# Patient Record
Sex: MALE | Race: OTHER | NOT HISPANIC OR LATINO
[De-identification: names, ages, dates, MRNs, and addresses within clinical notes are randomized per-mention and may not be internally consistent; named-entity substitution may affect disease eponyms.]

---

## 2020-04-22 ENCOUNTER — TRANSCRIPTION ENCOUNTER (OUTPATIENT)
Age: 40
End: 2020-04-22

## 2020-11-25 ENCOUNTER — EMERGENCY (EMERGENCY)
Facility: HOSPITAL | Age: 40
LOS: 1 days | Discharge: HOME | End: 2020-11-25
Attending: EMERGENCY MEDICINE
Payer: COMMERCIAL

## 2020-11-25 VITALS
TEMPERATURE: 98 F | HEIGHT: 74 IN | HEART RATE: 122 BPM | SYSTOLIC BLOOD PRESSURE: 126 MMHG | DIASTOLIC BLOOD PRESSURE: 86 MMHG | OXYGEN SATURATION: 97 % | RESPIRATION RATE: 18 BRPM | WEIGHT: 246.04 LBS

## 2020-11-25 VITALS
SYSTOLIC BLOOD PRESSURE: 131 MMHG | OXYGEN SATURATION: 99 % | HEART RATE: 116 BPM | DIASTOLIC BLOOD PRESSURE: 84 MMHG | RESPIRATION RATE: 18 BRPM

## 2020-11-25 PROCEDURE — 13132 CMPLX RPR F/C/C/M/N/AX/G/H/F: CPT

## 2020-11-25 PROCEDURE — 90715 TDAP VACCINE 7 YRS/> IM: CPT

## 2020-11-25 PROCEDURE — 90675 RABIES VACCINE IM: CPT

## 2020-11-25 PROCEDURE — 96372 THER/PROPH/DIAG INJ SC/IM: CPT | Mod: XU

## 2020-11-25 PROCEDURE — 90377 RABIES IG HT&SOL HUMAN IM/SC: CPT

## 2020-11-25 PROCEDURE — 90471 IMMUNIZATION ADMIN: CPT

## 2020-11-25 PROCEDURE — 90472 IMMUNIZATION ADMIN EACH ADD: CPT

## 2020-11-25 PROCEDURE — 99285 EMERGENCY DEPT VISIT HI MDM: CPT | Mod: 25

## 2020-11-25 PROCEDURE — 99284 EMERGENCY DEPT VISIT MOD MDM: CPT

## 2020-11-25 RX ORDER — IBUPROFEN 200 MG
600 TABLET ORAL ONCE
Refills: 0 | Status: COMPLETED | OUTPATIENT
Start: 2020-11-25 | End: 2020-11-25

## 2020-11-25 RX ORDER — HUMAN RABIES VIRUS IMMUNE GLOBULIN 150 [IU]/ML
2200 INJECTION, SOLUTION INTRAMUSCULAR ONCE
Refills: 0 | Status: COMPLETED | OUTPATIENT
Start: 2020-11-25 | End: 2020-11-25

## 2020-11-25 RX ORDER — ACETAMINOPHEN 500 MG
650 TABLET ORAL ONCE
Refills: 0 | Status: COMPLETED | OUTPATIENT
Start: 2020-11-25 | End: 2020-11-25

## 2020-11-25 RX ORDER — RABIES VACC, HUMAN DIPLOID/PF 2.5 UNIT
1 VIAL (EA) INTRAMUSCULAR ONCE
Refills: 0 | Status: COMPLETED | OUTPATIENT
Start: 2020-11-25 | End: 2020-11-25

## 2020-11-25 RX ORDER — TETANUS TOXOID, REDUCED DIPHTHERIA TOXOID AND ACELLULAR PERTUSSIS VACCINE, ADSORBED 5; 2.5; 8; 8; 2.5 [IU]/.5ML; [IU]/.5ML; UG/.5ML; UG/.5ML; UG/.5ML
0.5 SUSPENSION INTRAMUSCULAR ONCE
Refills: 0 | Status: COMPLETED | OUTPATIENT
Start: 2020-11-25 | End: 2020-11-25

## 2020-11-25 RX ADMIN — Medication 650 MILLIGRAM(S): at 19:08

## 2020-11-25 RX ADMIN — Medication 1 MILLILITER(S): at 20:59

## 2020-11-25 RX ADMIN — Medication 600 MILLIGRAM(S): at 19:08

## 2020-11-25 RX ADMIN — HUMAN RABIES VIRUS IMMUNE GLOBULIN 2200 UNIT(S): 150 INJECTION, SOLUTION INTRAMUSCULAR at 21:48

## 2020-11-25 RX ADMIN — TETANUS TOXOID, REDUCED DIPHTHERIA TOXOID AND ACELLULAR PERTUSSIS VACCINE, ADSORBED 0.5 MILLILITER(S): 5; 2.5; 8; 8; 2.5 SUSPENSION INTRAMUSCULAR at 19:40

## 2020-11-25 RX ADMIN — Medication 1 TABLET(S): at 20:20

## 2020-11-25 NOTE — ED PROVIDER NOTE - PROGRESS NOTE DETAILS
Plastics at bedside for laceration repair.  -Sher Corrigan PA-C Animal bite and rabies PEP form filled out online. [facility] contacted to report bite/rapies vaccine.   -Sher Corrigan PA-C

## 2020-11-25 NOTE — ED PROVIDER NOTE - PHYSICAL EXAMINATION
MERCY Corrigan PA-C see below:  GEN: NAD, awake, eyes open spontaneously  HEENT: NCAT, MMM, Trachea midline, normal conjunctiva, perrl  CHEST/LUNGS: Non-tachypneic, CTAB, bilateral breath sounds  CARDIAC: s1/,s2, RRR.  ABDOMEN: Soft, NTND, No rebound/guarding  MSK: No edema, no gross deformity of extremities  SKIN: +open laceration to right side of upper lip, bite madiha laceration on inside of right bicep  NEURO: CN grossly intact, normal coordination, no focal motor or sensory deficits  PSYCH: Alert, appropriate, cooperative, with capacity and insight   -------------------------- MERCY Corrigan PA-C see below:  GEN: NAD, awake, eyes open spontaneously  HEENT: NCAT, MMM, Trachea midline, normal conjunctiva, perrl  CHEST/LUNGS: Non-tachypneic, CTAB, bilateral breath sounds  CARDIAC: s1/,s2, RRR.  ABDOMEN: Soft, NTND, No rebound/guarding  MSK: No edema, no gross deformity of extremities  SKIN: +open laceration to right side of upper lip, bite madiha laceration on inside of right bicep - sensation intact  NEURO: CN grossly intact, normal coordination, no focal motor or sensory deficits  PSYCH: Alert, appropriate, cooperative, with capacity and insight   -------------------------- MERCY Corrigan PA-C see below:  GEN: NAD, awake, eyes open spontaneously  HEENT: NCAT, MMM, Trachea midline, normal conjunctiva, perrl  CHEST/LUNGS: Non-tachypneic, CTAB, bilateral breath sounds  CARDIAC: s1/,s2, RRR.  ABDOMEN: Soft, NTND, No rebound/guarding  MSK: No edema, no gross deformity of extremities  SKIN: +open laceration to right side of upper lip, bite madiha laceration on medial aspect of right bicep - sensation intact.  No tenderness to palpation at R bicep  NEURO: CN grossly intact, normal coordination, no focal motor or sensory deficits  PSYCH: Alert, appropriate, cooperative, with capacity and insight   --------------------------

## 2020-11-25 NOTE — ED PROVIDER NOTE - NSFOLLOWUPINSTRUCTIONS_ED_ALL_ED_FT
You were seen in the Emergency Department for a dog bite. Your laceration was repaired, you received antibiotics, a tetanus vaccination, and a rabies vaccine/rabies immunoglobulin.  You must return on days 3, 7, and 14 from bite (November 28th, December 2nd, and December 9th) for three more doses of rabies immunoglobulin.   Please take Augmentin 2x a day for 7 days for antibiotic treatment.   Follow-up with your primary care provider in 1-2 days.  Continue to take all medications as prescribed.  Rest and drink plenty of fluids. Pain can be managed with Acetaminophen (aka Tylenol) and Ibuprofen (aka Motrin or Advil) over the counter as directed.  Return to the ER for any new or worsening symptoms such as decreased sensation/motor function, fever/chills, drainage/pus from sight, increased redness, worsening pain.

## 2020-11-25 NOTE — ED PROVIDER NOTE - RAPID ASSESSMENT
40 year old M  with pmhx of HTN and DM presents to ED c/o dog bite to lip. Pt was changing shifts when a stray dog bit him on upper and lower lip. No pain meds PTA. Unsure if tetanus is UTD. NKDA.     Chris cMintosh (MD) note: The scribe's (Lali Tran) documentation has been prepared under my direction and personally reviewed by me.  Patient was seen as a tele QDOC patient. The patient will be seen and further worked up in the main emergency department and their care will be completed by the main emergency department team along with a thorough physical exam. Receiving team will follow up on labs, analgesia, any clinical imaging, reassess and disposition as clinically indicated, all decisions regarding the progression of care will be made at their discretion.     Scribe Statement: IChelsea, attest that this documentation has been prepared under the direction and in the presence of Doctor Chris Mcintosh (MD) 40 year old M  with pmhx of HTN and DM presents to ED c/o dog bite to lip. Pt was off duty and changing shifts when a stray dog bit him on upper and lower lip. No pain meds PTA. Unsure if tetanus is UTD. NKDA.     Chris Mcintosh (MD) note: The scribe's (Lali Tran) documentation has been prepared under my direction and personally reviewed by me.  Patient was seen as a tele QDOC patient. The patient will be seen and further worked up in the main emergency department and their care will be completed by the main emergency department team along with a thorough physical exam. Receiving team will follow up on labs, analgesia, any clinical imaging, reassess and disposition as clinically indicated, all decisions regarding the progression of care will be made at their discretion.     Scribe Statement: Chelsea MILLS, attest that this documentation has been prepared under the direction and in the presence of Doctor Chris Mcintosh (MD)

## 2020-11-25 NOTE — ED PROVIDER NOTE - PATIENT PORTAL LINK FT
You can access the FollowMyHealth Patient Portal offered by [facility] by registering at the following website: [email or web address]. By joining [employer]’s FollowMyHealth portal, you will also be able to view your health information using other applications (apps) compatible with our system.

## 2020-11-25 NOTE — ED ADULT NURSE NOTE - OBJECTIVE STATEMENT
41 Yo male PMHx HTN, DM c/o dog bite to R-lip and R-arm. Patient reports dog is a stray, unknown immunizations. Dog appears well, no obvious health issues noted. Patient is A&OX3, laceration to R-upper lip and superficial bite madiha to R-forearm. patient c/o pain to bite areas, given pain medication in triage with some relief. denies chest pain, SOB, HA, N/V/D, abdominal pain, fever/chills, urinary symptoms, hematuria, weakness, dizziness, numbness, tinging. Peripheral pulses present b/l. Skin warm, dry and pink. Pt placed in position of comfort. Pt educated on call bell system and provided call bell. Bed in lowest position, wheels locked, appropriate side rails raised. Pt denies needs at this time.

## 2020-11-25 NOTE — ED PROVIDER NOTE - CLINICAL SUMMARY MEDICAL DECISION MAKING FREE TEXT BOX
Upper lip and R medial bicep laceration 2/2 dog bite.  No e/o fb, infection, RUE is NVI, compartments soft.  Unknown dog, likely domesticated as reported to have leash/collar but no owner and it is unclear if dog was captured by animal control.  Already contacted plastic surgeon who will see pt in ED.  Will need tdap, abx, rabies Ig and Ab.  Reassess.  --BMM

## 2020-11-28 ENCOUNTER — EMERGENCY (EMERGENCY)
Facility: HOSPITAL | Age: 40
LOS: 1 days | Discharge: HOME | End: 2020-11-28
Attending: EMERGENCY MEDICINE
Payer: COMMERCIAL

## 2020-11-28 VITALS
HEIGHT: 74 IN | TEMPERATURE: 98 F | DIASTOLIC BLOOD PRESSURE: 82 MMHG | RESPIRATION RATE: 20 BRPM | OXYGEN SATURATION: 99 % | WEIGHT: 246.04 LBS | SYSTOLIC BLOOD PRESSURE: 133 MMHG | HEART RATE: 113 BPM

## 2020-11-28 VITALS
SYSTOLIC BLOOD PRESSURE: 118 MMHG | HEART RATE: 107 BPM | OXYGEN SATURATION: 96 % | TEMPERATURE: 98 F | RESPIRATION RATE: 20 BRPM | DIASTOLIC BLOOD PRESSURE: 86 MMHG

## 2020-11-28 PROCEDURE — 90675 RABIES VACCINE IM: CPT

## 2020-11-28 PROCEDURE — 99283 EMERGENCY DEPT VISIT LOW MDM: CPT | Mod: 25

## 2020-11-28 PROCEDURE — 90471 IMMUNIZATION ADMIN: CPT

## 2020-11-28 PROCEDURE — 99283 EMERGENCY DEPT VISIT LOW MDM: CPT

## 2020-11-28 RX ORDER — RABIES VACC, HUMAN DIPLOID/PF 2.5 UNIT
1 VIAL (EA) INTRAMUSCULAR ONCE
Refills: 0 | Status: COMPLETED | OUTPATIENT
Start: 2020-11-28 | End: 2020-11-28

## 2020-11-28 RX ORDER — RABIES VACC, HUMAN DIPLOID/PF 2.5 UNIT
1 VIAL (EA) INTRAMUSCULAR ONCE
Refills: 0 | Status: DISCONTINUED | OUTPATIENT
Start: 2020-11-28 | End: 2020-12-02

## 2020-11-28 RX ADMIN — Medication 1 MILLILITER(S): at 19:42

## 2020-11-28 NOTE — ED PROVIDER NOTE - CLINICAL SUMMARY MEDICAL DECISION MAKING FREE TEXT BOX
40y m pmhx HTN, DM presenting for 2nd rabies vaccine. pt initially seen here 2 days ago after dog bite to lip and arm. had primary repair done by plastics, was given tetanus, rabies IG and first rabies vacc. denies symptoms since first vac, has followup with plastics. well appearing, non focal exam. will give 2nd rabies vacc, ban - Levi Barone PA-C 40y m pmhx HTN, DM presenting for 2nd rabies vaccine. pt initially seen here 2 days ago after dog bite to lip and arm. had primary repair done by plastics, was given tetanus, rabies IG and first rabies vacc. denies symptoms since first vac, has followup with plastics. well appearing, non focal exam. will give 2nd rabies vacc, dc - LONI Bledsoe MD - Attending Physician: Pt here with repeat rabies vaccination. No complaints. Today is day 3. Wound healing. Notes arm pain at site of Tdap, but no signs of cellulitis, FROM arm. Return on 12/2 for 3rd dose of vaccine

## 2020-11-28 NOTE — ED PROVIDER NOTE - NSFOLLOWUPINSTRUCTIONS_ED_ALL_ED_FT
You were seen in the Emergency Department for a dog bite. Your laceration was repaired, you received antibiotics, a tetanus vaccination, and a rabies vaccine/rabies immunoglobulin.    You must return on days 3, 7, and 14 from bite (November 28th, December 2nd, and December 9th) for three more doses of rabies immunoglobulin.     Please take Augmentin 2x a day for 7 days for antibiotic treatment.     Follow-up with your primary care provider in 1-2 days.    Continue to take all medications as prescribed.    Rest and drink plenty of fluids.     Pain can be managed with Acetaminophen (aka Tylenol) and Ibuprofen (aka Motrin or Advil) over the counter as directed.  Return to the ER for any new or worsening symptoms such as decreased sensation/motor function, fever/chills, drainage/pus from sight, increased redness, worsening pain You were seen in the Emergency Department for a dog bite. Your laceration was repaired, you received antibiotics, a tetanus vaccination, and a rabies vaccine/rabies immunoglobulin.    You must return on days 7, and 14 from bite (December 2nd, and December 9th) for two more doses of rabies immunoglobulin.     Please take Augmentin 2x a day for 7 days for antibiotic treatment.     Follow-up with your primary care provider in 1-2 days.    Continue to take all medications as prescribed.    Rest and drink plenty of fluids.     Pain can be managed with Acetaminophen (aka Tylenol) and Ibuprofen (aka Motrin or Advil) over the counter as directed.  Return to the ER for any new or worsening symptoms such as decreased sensation/motor function, fever/chills, drainage/pus from sight, increased redness, worsening pain

## 2020-11-28 NOTE — ED PROVIDER NOTE - SKIN, MLM
Skin normal color for race, warm, dry and intact. No evidence of rash. Laceration repair sites intact without warmth, redness, discharge, or tenderness.

## 2020-11-28 NOTE — ED ADULT NURSE REASSESSMENT NOTE - NS ED NURSE REASSESS COMMENT FT1
Handoff report received from KUNAL Farley. Pt resting comfortably in stretcher in orange room 8. Pt denies any needs at this time. VSS. Pt denies pain. Gave pt rabies vaccine as per order. Pt tolerated well. Pending discharge paperwork.

## 2020-11-28 NOTE — ED ADULT NURSE NOTE - OBJECTIVE STATEMENT
39 yo male complaining about dog bite and needs a rabies vaccine (2nd) pt was in the ER 4 days ago. Just needs the second dosage of the vaccine. Pt alerted and oriented x3, no sings of acute distress noted at this moment, vitals WNL. no sings of infection, will continue to monitor closely, pt pending on MD examination

## 2020-11-28 NOTE — ED PROVIDER NOTE - PATIENT PORTAL LINK FT
You can access the FollowMyHealth Patient Portal offered by [facility] by registering at the following website: [email or web address]. By joining [employer]’s FollowMyHealth portal, you will also be able to view your health information using other applications (apps) compatible with our system.

## 2020-11-28 NOTE — ED PROVIDER NOTE - OBJECTIVE STATEMENT
40y m PMHx DM, HTN presenting for 2nd rabies vaccine. Pt was seen here 2 days ago after dog bite. pt works for [email or web address], sustained dog bite. in ED had primary repair and was given rabies IG and 1st dose of vaccine. reports minimal discomfort at the arm from the tetanus vaccine but denies fever, chills, redness/warth at injection sites, discharge, HA, confusion, or any other complaints. 40y m PMHx DM, HTN presenting for 2nd rabies vaccine. Pt was seen here 2 days ago after dog bite. pt works for [employer], sustained dog bite. in ED had primary repair and was given rabies IG and 1st dose of vaccine. reports minimal discomfort at the arm from the tetanus vaccine but denies fever, chills, redness/warmth at injection sites, discharge, HA, confusion, or any other complaints.

## 2020-12-03 ENCOUNTER — EMERGENCY (EMERGENCY)
Facility: HOSPITAL | Age: 40
LOS: 1 days | Discharge: HOME | End: 2020-12-03
Attending: EMERGENCY MEDICINE
Payer: COMMERCIAL

## 2020-12-03 VITALS
OXYGEN SATURATION: 98 % | HEART RATE: 103 BPM | SYSTOLIC BLOOD PRESSURE: 129 MMHG | RESPIRATION RATE: 16 BRPM | DIASTOLIC BLOOD PRESSURE: 81 MMHG | TEMPERATURE: 98 F | HEIGHT: 74 IN | WEIGHT: 255.96 LBS

## 2020-12-03 PROCEDURE — 90675 RABIES VACCINE IM: CPT

## 2020-12-03 PROCEDURE — 90471 IMMUNIZATION ADMIN: CPT

## 2020-12-03 PROCEDURE — 99283 EMERGENCY DEPT VISIT LOW MDM: CPT | Mod: 25

## 2020-12-03 PROCEDURE — 99283 EMERGENCY DEPT VISIT LOW MDM: CPT

## 2020-12-03 RX ORDER — RABIES VACC, HUMAN DIPLOID/PF 2.5 UNIT
1 VIAL (EA) INTRAMUSCULAR ONCE
Refills: 0 | Status: COMPLETED | OUTPATIENT
Start: 2020-12-03 | End: 2020-12-03

## 2020-12-03 RX ADMIN — Medication 1 MILLILITER(S): at 20:25

## 2020-12-03 NOTE — ED PROVIDER NOTE - NSFOLLOWUPINSTRUCTIONS_ED_ALL_ED_FT
[id number]  *More detailed information regarding your visit and discharge can be found by reviewing this packet.*  -----------------------------    Your diagnosis this visit was: encounter for rabies vaccine     From this ED visit you were prescribed: No new Rx. Please return in one week on Dec 10th for final rabies vaccine.     Please return to the Emergency Department if you experience any of the following symptoms:  - Shortness of breath or trouble breathing  - Pressure, pain, or tightness in the chest  - Face drooping, arm weakness or speech difficulty,  - Persistent or severe vomiting  - Head injury or loss of consciousness  - Nonstop bleeding or an open wound    Diet changes/restrictions   [ ] No Diet Restriction

## 2020-12-03 NOTE — ED PROVIDER NOTE - OBJECTIVE STATEMENT
40y m PMHx DM, HTN presenting for 3rd rabies vaccine after dog bite at work ([employer]). 40y m PMHx DM, HTN presenting for 3rd rabies vaccine after dog bite at work ([employer]). Pt w healing lacerations/abrasions to R upper inner forearm and R upper lip. No surrounding erythema, drainage, signs of infection. No neck stiffness, no sob, no cp, no ams, no fever.

## 2020-12-03 NOTE — ED PROVIDER NOTE - CLINICAL SUMMARY MEDICAL DECISION MAKING FREE TEXT BOX
40y m PMHx DM, HTN presenting for 3rd rabies vaccine. Wounds to R upper lip and R inner upper arm healing well. No signs of infection. No additional complaints. To return for final rabies vaccine on Dec 10th.

## 2020-12-03 NOTE — ED PROVIDER NOTE - PATIENT PORTAL LINK FT
You can access the FollowMyHealth Patient Portal offered by [facility] by registering at the following website: [email or web address]. By joining [employer]’s FollowMyHealth portal, you will also be able to view your health information using other applications (apps) compatible with our system.

## 2020-12-03 NOTE — ED ADULT NURSE NOTE - OBJECTIVE STATEMENT
patient is a 40 year old male who presents to the ED from home requesting 3rd rabies shot. patient was attacked by a stray dog. patient sustained a bite on the mouth. site is clean, dry and intact. no signs of infection noted to site. MD Gonsalez at bedside to assess. patient comfort and safety provided.

## 2020-12-03 NOTE — ED PROVIDER NOTE - PHYSICAL EXAMINATION
Gen: WNWD NAD  HEENT: NCAT  EOMI normal pharynx healing lac to R upper lip w some scabbing, no swelling, no erythema   Neck: supple  Ext: wwp, FROMx4, no cce  Skin: wound to R upper lip as described, healing R upper inner arm abrasion/superficial lac  Neuro: A&Ox3, CN grossly intact

## 2020-12-11 ENCOUNTER — EMERGENCY (EMERGENCY)
Facility: HOSPITAL | Age: 40
LOS: 1 days | Discharge: HOME | End: 2020-12-11
Attending: EMERGENCY MEDICINE
Payer: COMMERCIAL

## 2020-12-11 VITALS
SYSTOLIC BLOOD PRESSURE: 117 MMHG | RESPIRATION RATE: 18 BRPM | OXYGEN SATURATION: 100 % | DIASTOLIC BLOOD PRESSURE: 73 MMHG | TEMPERATURE: 98 F | HEART RATE: 90 BPM

## 2020-12-11 VITALS
TEMPERATURE: 98 F | WEIGHT: 246.04 LBS | SYSTOLIC BLOOD PRESSURE: 146 MMHG | RESPIRATION RATE: 20 BRPM | DIASTOLIC BLOOD PRESSURE: 85 MMHG | HEART RATE: 105 BPM | OXYGEN SATURATION: 98 % | HEIGHT: 74 IN

## 2020-12-11 PROCEDURE — 90675 RABIES VACCINE IM: CPT

## 2020-12-11 PROCEDURE — 99283 EMERGENCY DEPT VISIT LOW MDM: CPT

## 2020-12-11 PROCEDURE — 99283 EMERGENCY DEPT VISIT LOW MDM: CPT | Mod: 25

## 2020-12-11 PROCEDURE — 90471 IMMUNIZATION ADMIN: CPT

## 2020-12-11 RX ORDER — RABIES VACC, HUMAN DIPLOID/PF 2.5 UNIT
1 VIAL (EA) INTRAMUSCULAR ONCE
Refills: 0 | Status: COMPLETED | OUTPATIENT
Start: 2020-12-11 | End: 2020-12-11

## 2020-12-11 RX ADMIN — Medication 1 MILLILITER(S): at 20:41

## 2020-12-11 NOTE — ED PROVIDER NOTE - OBJECTIVE STATEMENT
39 y/o M with pmhx of htn, diabetes presents for last dose of rabies shot. Pt sustained dog bite on lip and R arm by a stray dog weeks ago. Pt denies any complaints.

## 2020-12-11 NOTE — ED PROVIDER NOTE - ATTENDING CONTRIBUTION TO CARE
History and Physical performed by me with scribe under my direct supervision.  MATILDE Nassar MD FACEP

## 2020-12-11 NOTE — ED PROVIDER NOTE - NSFOLLOWUPINSTRUCTIONS_ED_ALL_ED_FT
Your diagnosis this visit was: final rabies vaccine     From this ED visit you were prescribed: No new Rx. No need to return to the emergency department at this time. Follow up with your regular physician within the next 1-2 weeks.     Please return to the Emergency Department if you experience any of the following symptoms:  - Shortness of breath or trouble breathing  - Pressure, pain, or tightness in the chest  - Face drooping, arm weakness or speech difficulty,  - Persistent or severe vomiting  - Head injury or loss of consciousness  - Nonstop bleeding or an open wound

## 2020-12-11 NOTE — ED PROVIDER NOTE - PROGRESS NOTE DETAILS
Oscar Rodriguez, PGY-1: Pt examined at bedside, no signs of infection on oral wound or wound in RUE. Pt presenting for final rabies vaccine in series. No new symptoms since previous presentation. Will provide final dose and discharge for normal PCP follow-up

## 2020-12-11 NOTE — ED PROVIDER NOTE - CLINICAL SUMMARY MEDICAL DECISION MAKING FREE TEXT BOX
[facility] officer p/w request for final rabies vaccine. Accomplish same, d/c home. Pt counseled extensively on smoking cessation.

## 2020-12-11 NOTE — ED ADULT NURSE NOTE - OBJECTIVE STATEMENT
Pt is a 40 y M PMH HTN, DM presents for last dose of rabies shot. Pt sustained dog bite on lip and R arm by a stray dog weeks ago. Pt denies any reaction or issues to previous rabies shots. Pt denies any complaints at this time. A&Ox4, VINCENT, distal pulses intact, abdomen soft nontender, skin intact. Side rails up for safety, call bell and personal items within reach, instructed to call for assistance, verbalizes understanding. Will continue to monitor.

## 2020-12-11 NOTE — ED PROVIDER NOTE - PATIENT PORTAL LINK FT
You can access the FollowMyHealth Patient Portal offered by [facility] by registering at the following website: [email or web address]. By joining [employer]’s FollowMyHealth portal, you will also be able to view your health information using other applications (apps) compatible with our system.

## 2021-02-03 ENCOUNTER — TRANSCRIPTION ENCOUNTER (OUTPATIENT)
Age: 41
End: 2021-02-03

## 2021-06-09 PROBLEM — Z00.00 ENCOUNTER FOR PREVENTIVE HEALTH EXAMINATION: Noted: 2021-06-09

## 2021-06-11 ENCOUNTER — APPOINTMENT (OUTPATIENT)
Dept: ORTHOPEDIC SURGERY | Facility: CLINIC | Age: 41
End: 2021-06-11
Payer: COMMERCIAL

## 2021-06-11 VITALS — WEIGHT: 216 LBS | BODY MASS INDEX: 28.32 KG/M2 | HEIGHT: 73.23 IN

## 2021-06-11 DIAGNOSIS — M25.561 PAIN IN RIGHT KNEE: ICD-10-CM

## 2021-06-11 DIAGNOSIS — M21.162 VARUS DEFORMITY, NOT ELSEWHERE CLASSIFIED, RIGHT KNEE: ICD-10-CM

## 2021-06-11 DIAGNOSIS — M21.161 VARUS DEFORMITY, NOT ELSEWHERE CLASSIFIED, RIGHT KNEE: ICD-10-CM

## 2021-06-11 DIAGNOSIS — M25.562 PAIN IN RIGHT KNEE: ICD-10-CM

## 2021-06-11 DIAGNOSIS — G89.29 PAIN IN RIGHT KNEE: ICD-10-CM

## 2021-06-11 PROCEDURE — 73562 X-RAY EXAM OF KNEE 3: CPT | Mod: LT

## 2021-06-11 PROCEDURE — 99205 OFFICE O/P NEW HI 60 MIN: CPT

## 2021-06-11 RX ORDER — CELECOXIB 100 MG/1
100 CAPSULE ORAL TWICE DAILY
Qty: 10 | Refills: 0 | Status: ACTIVE | COMMUNITY
Start: 2021-06-11 | End: 1900-01-01

## 2022-03-10 NOTE — ED PROVIDER NOTE - OBJECTIVE STATEMENT
40y M PMH HTN, DM presenting s/p dog bite. Pt was bit by a stray dog while at work. Dog bit him on his lip and right arm. Unaware of dogs vaccines. Has not received Tetanus >10y. Pt endorses mild numbness at site but states sensation is intact. Pt denies any fever/chills, chest pain/SOB, abdominal pain, N/V/D. No

## 2022-10-05 NOTE — ED ADULT NURSE NOTE - NS ED NOTE  TALK SOMEONE YN
Breath sounds clear and equal bilaterally. No wheezing, rales or rhonchi. No chest wall tenderness. No

## 2023-02-02 ENCOUNTER — EMERGENCY (EMERGENCY)
Facility: HOSPITAL | Age: 43
LOS: 1 days | Discharge: HOME | End: 2023-02-02
Attending: EMERGENCY MEDICINE
Payer: COMMERCIAL

## 2023-02-02 VITALS
SYSTOLIC BLOOD PRESSURE: 127 MMHG | OXYGEN SATURATION: 98 % | HEART RATE: 90 BPM | DIASTOLIC BLOOD PRESSURE: 87 MMHG | TEMPERATURE: 98 F | WEIGHT: 229.94 LBS | RESPIRATION RATE: 17 BRPM | HEIGHT: 74 IN

## 2023-02-02 VITALS
SYSTOLIC BLOOD PRESSURE: 122 MMHG | HEART RATE: 88 BPM | OXYGEN SATURATION: 99 % | RESPIRATION RATE: 17 BRPM | DIASTOLIC BLOOD PRESSURE: 74 MMHG | TEMPERATURE: 98 F

## 2023-02-02 PROBLEM — E11.9 TYPE 2 DIABETES MELLITUS WITHOUT COMPLICATIONS: Chronic | Status: ACTIVE | Noted: 2020-11-25

## 2023-02-02 PROBLEM — I10 ESSENTIAL (PRIMARY) HYPERTENSION: Chronic | Status: ACTIVE | Noted: 2020-11-25

## 2023-02-02 PROCEDURE — 99284 EMERGENCY DEPT VISIT MOD MDM: CPT

## 2023-02-02 PROCEDURE — 73562 X-RAY EXAM OF KNEE 3: CPT | Mod: 26,50

## 2023-02-02 PROCEDURE — 73560 X-RAY EXAM OF KNEE 1 OR 2: CPT | Mod: 26,LT

## 2023-02-02 PROCEDURE — 73560 X-RAY EXAM OF KNEE 1 OR 2: CPT

## 2023-02-02 PROCEDURE — 73562 X-RAY EXAM OF KNEE 3: CPT

## 2023-02-02 RX ORDER — IBUPROFEN 200 MG
600 TABLET ORAL ONCE
Refills: 0 | Status: COMPLETED | OUTPATIENT
Start: 2023-02-02 | End: 2023-02-02

## 2023-02-02 RX ADMIN — Medication 600 MILLIGRAM(S): at 13:29
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
## 2023-02-02 NOTE — ED PROVIDER NOTE - PATIENT PORTAL LINK FT
You can access the FollowMyHealth Patient Portal offered by [facility] by registering at the following website: [email or web address]. By joining [employer]’s FollowMyHealth portal, you will also be able to view your health information using other applications (apps) compatible with our system.
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
## 2023-02-02 NOTE — ED PROVIDER NOTE - PHYSICAL EXAMINATION
Right medial knee tenderness to palpation no effusion no swelling no ecchymosis no joint instability.  Left knee with mild anterior tenderness to palpation no bruising no swelling or ecchymosis.  Patient able to ambulate with mild limp.  Left lumbar paraspinal back tenderness to palpation.  No midline spinal tenderness palpation.  Strength sensation intact in both arms and legs

## 2023-02-02 NOTE — ED PROVIDER NOTE - NSFOLLOWUPINSTRUCTIONS_ED_ALL_ED_FT
Knee Sprain, Adult       A knee sprain is a stretch or tear in a knee ligament. Knee ligaments are tissues that connect bones in the knee to each other.      What are the causes?    This condition often results from:  •A fall.      •An injury to the knee.        What are the signs or symptoms?    Symptoms of this condition include:  •Trouble straightening or bending the leg.      •Swelling in the knee.      •Bruising around the knee.      •Tenderness or pain in the knee.      •Muscle spasms around the knee.        How is this diagnosed?    This condition may be diagnosed based on:  •A physical exam.      •A history of what happened just before you started to have symptoms.    •Tests, including:  •An X-ray. This may be done to make sure no bones are broken.      •An MRI. This may be done to check if the ligament is torn.      •Stress testing of the knee. This may be done to check ligament damage.          How is this treated?    Treatment for this condition may involve:  •Keeping the knee still (immobilized) with a cast, brace, or splint.      •Applying ice to the knee. This helps with pain and swelling.      •Raising (elevating) the knee above the level of your heart when you are resting. This helps with pain and swelling.      •Taking medicine for pain.      •Doing exercises to prevent or limit permanent weakness or stiffness in your knee.      •Having surgery to reconnect the ligament to the bone or to reconstruct it. This may be needed if the ligament is completely torn.        Follow these instructions at home:    If you have a splint or brace:     •Wear it as told by your health care provider. Remove it only as told by your health care provider.      •Check the skin around it every day. Tell your health care provider about any concerns.      •Loosen it if your toes tingle, become numb, or turn cold and blue.      •Keep it clean and dry.      If you have a cast:     • Do not stick anything inside it to scratch your skin. Doing that increases your risk of infection.      •Check the skin around it every day. Tell your health care provider about any concerns.      •You may put lotion on dry skin around the edges of the cast. Do not put lotion on the skin underneath the cast.      •Keep it clean and dry.      Bathing     If you have a splint, brace, or cast that is not waterproof:  •Do not let it get wet.      •Cover it with a watertight covering when you take a bath or a shower.        Managing pain, stiffness, and swelling  •If directed, put ice on the injured area. To do this:  •If you have a removable splint or brace, remove it as told by your health care provider.      •Put ice in a plastic bag.      •Place a towel between your skin and the bag or between your cast and the bag.      •Leave the ice on for 20 minutes, 2–3 times a day.        •Move your toes often to reduce stiffness and swelling.      •Elevate the injured area above the level of your heart while you are sitting or lying down.      General instructions    •Take over-the-counter and prescription medicines only as told by your health care provider.      •Do not use any products that contain nicotine or tobacco, such as cigarettes, e-cigarettes, and chewing tobacco. These can delay healing. If you need help quitting, ask your health care provider.      •Do exercises as told by your health care provider.      •Keep all follow-up visits as told by your health care provider. This is important.        Contact a health care provider if:    •You have pain that gets worse.      •The cast, brace, or splint does not fit right.      •The cast, brace, or splint gets damaged.        Get help right away if:    •You cannot use your injured knee to support any of your body weight (cannot bear weight).      •You cannot move the injured joint.      •You cannot walk more than a few steps without pain or without your knee buckling.      •You have significant pain, swelling, or numbness in the leg below the cast, brace, or splint.      •Your foot or toes are numb, cold, or blue after loosening your splint or brace.        Summary    •A knee sprain is a stretch or tear in a knee ligament that usually occurs as the result of a fall or injury.      •Treatment may involve immobilizing the knee with a cast, splint, or brace and then doing exercises.      •If the ligament is completely torn, it may require surgery to repair or replace the injured ligament.      This information is not intended to replace advice given to you by your health care provider. Make sure you discuss any questions you have with your health care provider.
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
## 2023-02-10 ENCOUNTER — APPOINTMENT (OUTPATIENT)
Dept: ORTHOPEDIC SURGERY | Facility: CLINIC | Age: 43
End: 2023-02-10

## 2023-05-23 ENCOUNTER — NON-APPOINTMENT (OUTPATIENT)
Age: 43
End: 2023-05-23

## 2023-05-23 ENCOUNTER — APPOINTMENT (OUTPATIENT)
Dept: ORTHOPEDIC SURGERY | Facility: CLINIC | Age: 43
End: 2023-05-23
Payer: OTHER MISCELLANEOUS

## 2023-05-23 DIAGNOSIS — M17.12 UNILATERAL PRIMARY OSTEOARTHRITIS, LEFT KNEE: ICD-10-CM

## 2023-05-23 PROCEDURE — 99204 OFFICE O/P NEW MOD 45 MIN: CPT

## 2023-05-23 RX ORDER — DICLOFENAC SODIUM 50 MG/1
50 TABLET, DELAYED RELEASE ORAL
Qty: 60 | Refills: 0 | Status: ACTIVE | COMMUNITY
Start: 2023-05-23 | End: 1900-01-01

## 2023-05-23 NOTE — DISCUSSION/SUMMARY
[de-identified] : Discussed findings of today's exam and possible causes of patient's pain.  Educated patient on their most probable diagnosis of chronic 15+ years of intermittent bilateral knee pain with recent atraumatic exacerbation due to underlying severe osteoarthritis of the right knee and moderate osteoarthritis of the left knee.  Reviewed possible courses of treatment, and we collaboratively decided best course of treatment at this time will include conservative management.  Patient states that he is having knee pain from a fall that was sustained in 2008 as a work-related injury.  Patient has in system x-rays from 2014 which showed he already had severe osteoarthritis in the right knee and moderate osteoarthritis in the left knee.  Patient recently had a minor mechanical fall in February of this year which caused exacerbation of his pain, he had updated x-rays at that time which showed no significant interval change from 2014, it just redemonstrated severe osteoarthritis in the medial compartment of the right knee and moderate osteoarthritis of the left knee.  Patient appears to have primary osteoarthritis, this does not appear to be posttraumatic osteoarthritis.  Unsure how this is related to a Worker's Compensation injury as there does not seem to be a direct correlation from his injury to later development of osteoarthritis.  Patient states he is already had multiple cortisone injections over the years, he states they have not provided him relief.  At this time he is just having exacerbation of severe OA particularly of the right knee, recommend he follow-up with his joint replacement surgeon to discuss possible knee replacement surgery which was the advice at last evaluation in 2021.  Patient started on a course of oral NSAIDs for short-term pain relief, prescription given for diclofenac (We discussed all possible side effects of this medication).  Patient states he missed work for today's appointment, but otherwise has been working regularly.  Patient appreciates and agrees with current plan.\par \par \par This note was generated using dragon medical dictation software.  A reasonable effort has been made for proofreading its contents, but typos may still remain.  If there are any questions or points of clarification needed please notify my office.
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
## 2023-05-23 NOTE — RETURN TO WORK/SCHOOL
[FreeTextEntry1] : Ulises was seen in the office today for evaluation of bilateral knee osteoarthritis.\par Should you have any questions please call the office at [phone]\par Thank you for your understanding.\par \par Sincerely,\par \par Albino Otto DO, ATC\par Primary Care Sports Medicine\par [facility] [place]\par

## 2023-05-23 NOTE — PHYSICAL EXAM
[de-identified] : Constitutional: Well-nourished, well-developed, No acute distress\par Respiratory:  Good respiratory effort, no SOB\par Lymphatic: No regional lymphadenopathy, no lymphedema\par Psychiatric: Pleasant and normal affect, alert and oriented x3\par Musculoskeletal: normal except where as noted in regional exam\par \par Bilateral knees:\par APPEARANCE: + enlargement of the distal femur and proximal tibia, + varus deformity, 1+ right knee swelling\par POSITIVE TENDERNESS:  Distal femur, proximal tibia, medial jt line/retinaculum, and lateral jt line/retinaculum\par NONTENDER: patellar & quadriceps tendons, MCL/LCL, ITB at the lateral femoral condyle & Gerdy's tubercle, pes bursa. \par ROM: full extension, limited flexion to 110° due to stiffness and pain. \par RESISTIVE TESTING: painless resisted knee flex/ext, although + crepitus felt in anterior knee. \par SPECIAL TESTS: stable v/v stress. painless grind. neg ant/post drawer. + Sourav's for pain in medial and lateral joint line. \par  [de-identified] : I reviewed, interpreted and clinically correlated the following outside imaging studies,\par In system x-rays, 3 views of the bilateral knees from February 2023, evidence of severe medial compartment osteoarthritis with bone-on-bone contact of the right knee, moderate tricompartmental osteoarthritis of the left knee with medial joint line narrowing\par These x-rays were compared to in system x-rays from 2014 which had the same findings without significant interval change.\par \par ____________\par  ACC: [id number] EXAM: XR KNEE AP LAT OBL 3 VIEWS BI ORDERED BY: GAY MOSQUERA\par \par ACC: [id number] EXAM: XR PATELLA 1-2 VIEWS LT ORDERED BY: GAY MOSQUERA\par \par PROCEDURE DATE: 02/02/2023\par \par \par \par INTERPRETATION: Bilateral knees. Patient has local pain after trauma.\par \par Left knee. 4 views are included on the left knee had been and 2 views are included under patellar headache.\par \par There is no definite effusion. There is moderate degeneration mainly around the articular patellar tendon also outer spurring of the medial compartment. No fracture. There is variant anatomy of independent bone fragment superolaterally in the left patella not to be confused with fracture.\par \par Right knee. 2 views obtained.\par \par No effusion. Similar degeneration. No fracture.\par \par IMPRESSION: Bilateral moderate knee degeneration. No acute fracture.

## 2023-06-09 ENCOUNTER — APPOINTMENT (OUTPATIENT)
Dept: ORTHOPEDIC SURGERY | Facility: CLINIC | Age: 43
End: 2023-06-09
Payer: OTHER MISCELLANEOUS

## 2023-06-09 ENCOUNTER — TRANSCRIPTION ENCOUNTER (OUTPATIENT)
Age: 43
End: 2023-06-09

## 2023-06-09 VITALS
SYSTOLIC BLOOD PRESSURE: 121 MMHG | DIASTOLIC BLOOD PRESSURE: 81 MMHG | HEIGHT: 73 IN | BODY MASS INDEX: 29.82 KG/M2 | HEART RATE: 92 BPM | WEIGHT: 225 LBS

## 2023-06-09 DIAGNOSIS — M17.11 UNILATERAL PRIMARY OSTEOARTHRITIS, RIGHT KNEE: ICD-10-CM

## 2023-06-09 PROCEDURE — 99455 WORK RELATED DISABILITY EXAM: CPT

## 2023-06-13 PROBLEM — M17.11 PRIMARY LOCALIZED OSTEOARTHRITIS OF RIGHT KNEE: Status: ACTIVE | Noted: 2021-06-11

## 2023-06-14 ENCOUNTER — FORM ENCOUNTER (OUTPATIENT)
Age: 43
End: 2023-06-14

## 2023-06-20 ENCOUNTER — FORM ENCOUNTER (OUTPATIENT)
Age: 43
End: 2023-06-20

## 2023-06-21 ENCOUNTER — RX RENEWAL (OUTPATIENT)
Age: 43
End: 2023-06-21

## 2023-06-21 ENCOUNTER — APPOINTMENT (OUTPATIENT)
Dept: ORTHOPEDIC SURGERY | Facility: CLINIC | Age: 43
End: 2023-06-21
Payer: OTHER MISCELLANEOUS

## 2023-06-21 VITALS — WEIGHT: 225 LBS | BODY MASS INDEX: 29.82 KG/M2 | HEIGHT: 73 IN | TEMPERATURE: 98.4 F

## 2023-06-21 DIAGNOSIS — M47.817 SPONDYLOSIS W/OUT MYELOPATHY OR RADICULOPATHY, LUMBOSACRAL REGION: ICD-10-CM

## 2023-06-21 DIAGNOSIS — M47.812 SPONDYLOSIS W/OUT MYELOPATHY OR RADICULOPATHY, CERVICAL REGION: ICD-10-CM

## 2023-06-21 PROCEDURE — 72100 X-RAY EXAM L-S SPINE 2/3 VWS: CPT

## 2023-06-21 PROCEDURE — 99214 OFFICE O/P EST MOD 30 MIN: CPT

## 2023-06-21 PROCEDURE — 72040 X-RAY EXAM NECK SPINE 2-3 VW: CPT

## 2023-06-21 RX ORDER — CYCLOBENZAPRINE HYDROCHLORIDE 10 MG/1
10 TABLET, FILM COATED ORAL 3 TIMES DAILY
Qty: 60 | Refills: 0 | Status: ACTIVE | COMMUNITY
Start: 2023-06-21 | End: 1900-01-01

## 2023-06-21 NOTE — PHYSICAL EXAM
[Antalgic] : not antalgic [Ataxic] : not ataxic [de-identified] : Examination of the cervical spine reveals no midline or paraspinal tenderness to palpation. No cervical lymphadenopathy. Decreased range of motion with respect to flexion, extension, rotation, and lateral bending. Negative Spurlings. Negative Lhermitte's. Full range of motion bilateral shoulders without evidence of impingement. No instability of bilateral upper extremities.  Cranial nerves II through XII grossly intact. Intact sensation bilateral upper extremities. 5/5 deltoids biceps triceps wrist extensors wrist flexors finger flexors and hand intrinsics. 1+ biceps triceps and brachioradialis reflexes. Negative Rajan's. 2+ radial pulse. Negative Tinel's over the cubital and carpal tunnel. No skin lesions on the right and left upper extremities.\par \par Examination of the lumbar spine reveals no midline tenderness palpation, step-offs, or skin lesions. Decreased range of motion with respect to flexion, extension, lateral bending, and rotation. No tenderness to palpation of the sciatic notch. No tenderness palpation of the bilateral greater trochanters. No pain with passive internal/external rotation of the hips. No instability of bilateral lower extremities.  Negative BANG. Negative straight leg raise bilaterally. No bowstring. Negative femoral stretch. 5 out of 5 iliopsoas, hip abductors, hips adductors, quadriceps, hamstrings, gastrocsoleus, tibialis anterior, extensor hallucis longus, peroneals. Grossly intact sensation to light touch bilateral lower extremities. 1+ patellar and Achilles reflexes. Downgoing Babinski. No clonus. Intact proprioception. Palpable pulses. No skin lesion and no edema on the right and left lower extremities. [de-identified] : AP lateral cervical x-rays with some spondylosis without instability or aggressive lesions\par \par AP lateral lumbar x-rays with spondylosis without instability or aggressive lesions.
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
## 2023-06-21 NOTE — DISCUSSION/SUMMARY
[de-identified] : We discussed further treatment options.  He will try course of physical therapy.  MRIs if not improved or worsen.

## 2023-06-22 ENCOUNTER — FORM ENCOUNTER (OUTPATIENT)
Age: 43
End: 2023-06-22

## 2023-06-25 ENCOUNTER — FORM ENCOUNTER (OUTPATIENT)
Age: 43
End: 2023-06-25

## 2023-06-26 RX ORDER — HYALURONATE SODIUM, STABILIZED 88 MG/4 ML
88 SYRINGE (ML) INTRAARTICULAR
Qty: 2 | Refills: 0 | Status: ACTIVE | COMMUNITY
Start: 2023-06-09

## 2023-06-27 ENCOUNTER — FORM ENCOUNTER (OUTPATIENT)
Age: 43
End: 2023-06-27

## 2023-09-05 ENCOUNTER — RX RENEWAL (OUTPATIENT)
Age: 43
End: 2023-09-05

## 2023-10-11 ENCOUNTER — RX RENEWAL (OUTPATIENT)
Age: 43
End: 2023-10-11

## 2024-03-14 NOTE — ED ADULT NURSE NOTE - PAIN: PRESENCE, MLM
[Time Spent: ___ minutes] : I have spent [unfilled] minutes of time on the encounter.
complains of pain/discomfort

## 2025-02-04 NOTE — ED ADULT TRIAGE NOTE - NSWEIGHTCALCTOOLDRUG_GEN_A_CORE
Problem: Adult Inpatient Plan of Care  Goal: Plan of Care Review  Outcome: Progressing  Goal: Patient-Specific Goal (Individualized)  Outcome: Progressing  Goal: Optimal Comfort and Wellbeing  Outcome: Progressing     Problem: Chemotherapy Effects  Goal: Anemia Symptom Improvement  Outcome: Progressing  Goal: Safety Maintained  Outcome: Progressing  Goal: Absence of Hematuria  Outcome: Progressing  Goal: Nausea and Vomiting Relief  Outcome: Progressing  Goal: Neurotoxicity Symptom Control  Outcome: Progressing  Goal: Absence of Infection  Outcome: Progressing  Goal: Absence of Bleeding  Outcome: Progressing     Problem: Fall Injury Risk  Goal: Absence of Fall and Fall-Related Injury  Outcome: Progressing       used